# Patient Record
Sex: FEMALE | Race: WHITE
[De-identification: names, ages, dates, MRNs, and addresses within clinical notes are randomized per-mention and may not be internally consistent; named-entity substitution may affect disease eponyms.]

---

## 2021-07-04 ENCOUNTER — HOSPITAL ENCOUNTER (EMERGENCY)
Dept: HOSPITAL 8 - ED | Age: 71
Discharge: HOME | End: 2021-07-04
Payer: MEDICARE

## 2021-07-04 VITALS — WEIGHT: 170.42 LBS | HEIGHT: 68 IN | BODY MASS INDEX: 25.83 KG/M2

## 2021-07-04 DIAGNOSIS — S06.0X1A: Primary | ICD-10-CM
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
## 2021-07-04 DIAGNOSIS — S00.11XA: ICD-10-CM

## 2021-07-04 DIAGNOSIS — S00.33XA: ICD-10-CM
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
## 2021-07-04 DIAGNOSIS — S00.83XA: ICD-10-CM
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
## 2021-07-04 DIAGNOSIS — R41.3: ICD-10-CM

## 2021-07-04 DIAGNOSIS — M54.2: ICD-10-CM

## 2021-07-04 DIAGNOSIS — R41.0: ICD-10-CM

## 2021-07-04 PROCEDURE — 70450 CT HEAD/BRAIN W/O DYE: CPT

## 2021-07-04 PROCEDURE — 72125 CT NECK SPINE W/O DYE: CPT

## 2021-07-04 PROCEDURE — 99285 EMERGENCY DEPT VISIT HI MDM: CPT

## 2021-07-04 PROCEDURE — 70486 CT MAXILLOFACIAL W/O DYE: CPT

## 2021-07-04 NOTE — NUR
recheck. pt oriented to self and place, not situation or time. fam at bedside. 
pt repeats "what happened, did i fall" requently. some abrasions on face. 
pearrl. holden. vss. denies pain at the moment. as